# Patient Record
Sex: FEMALE | Race: WHITE | URBAN - METROPOLITAN AREA
[De-identification: names, ages, dates, MRNs, and addresses within clinical notes are randomized per-mention and may not be internally consistent; named-entity substitution may affect disease eponyms.]

---

## 2017-01-16 ENCOUNTER — OFFICE VISIT (OUTPATIENT)
Dept: FAMILY MEDICINE CLINIC | Age: 52
End: 2017-01-16

## 2017-01-16 VITALS
WEIGHT: 171.25 LBS | SYSTOLIC BLOOD PRESSURE: 142 MMHG | DIASTOLIC BLOOD PRESSURE: 84 MMHG | RESPIRATION RATE: 16 BRPM | HEIGHT: 69 IN | OXYGEN SATURATION: 98 % | BODY MASS INDEX: 25.36 KG/M2 | HEART RATE: 76 BPM | TEMPERATURE: 97.7 F

## 2017-01-16 DIAGNOSIS — I10 HTN, GOAL BELOW 140/90: Primary | ICD-10-CM

## 2017-01-16 DIAGNOSIS — J01.10 ACUTE NON-RECURRENT FRONTAL SINUSITIS: ICD-10-CM

## 2017-01-16 RX ORDER — AMOXICILLIN AND CLAVULANATE POTASSIUM 875; 125 MG/1; MG/1
1 TABLET, FILM COATED ORAL EVERY 12 HOURS
Qty: 10 TAB | Refills: 0 | Status: SHIPPED | OUTPATIENT
Start: 2017-01-16 | End: 2017-01-21

## 2017-01-16 RX ORDER — LISINOPRIL 5 MG/1
5 TABLET ORAL DAILY
Qty: 90 TAB | Refills: 0 | Status: SHIPPED | OUTPATIENT
Start: 2017-01-16 | End: 2020-10-13

## 2017-01-16 NOTE — PROGRESS NOTES
Patient Name: Maggi Gudino   MRN: [id number]    Leni Atkins is a 45 y.o. female who presents with the following:  Transferring care from prior PCP Dr. Harinder Damon. HTN  Was recommended to start on lisinopril 20 mg daily per last OV with prior PCP Dr. Harinder Damon but pt has not started it yet because she is weary of medications and would to ask clarifying questions. Has been taking her BP values at home which range b/t 140-150/70s. Denies CP, SOB, or leg edema. BP Readings from Last 3 Encounters:   01/16/17 142/84   12/30/16 140/81   08/12/16 161/90     Sinus pressure  Reports 3 weeks hx of frontal facial pressure, nasal congestion with green/brown mucus (worse in the mornings), and cough. Denies fever, nausea, vomiting. Has been using Nasocort and saline sprays. Noticed an irritated sore sport along the inner L nostril. Has been using Neosporin and Vaseline. Review of Systems   Constitutional: Negative. Negative for fever, malaise/fatigue and weight loss. HENT: Positive for congestion. Negative for ear discharge, ear pain, hearing loss, sore throat and tinnitus. Respiratory: Negative for cough, hemoptysis, shortness of breath and wheezing. Cardiovascular: Negative for chest pain, palpitations, leg swelling and PND. Gastrointestinal: Negative for abdominal pain, blood in stool, constipation, diarrhea, nausea and vomiting. Neurological: Positive for headaches. The patient's medications, allergies, past medical history, surgical history, family history and social history were reviewed and updated where appropriate. Current Outpatient Prescriptions   Medication Sig    lisinopril (PRINIVIL, ZESTRIL) 5 mg tablet Take 1 Tab by mouth daily.  amoxicillin-clavulanate (AUGMENTIN) 875-125 mg per tablet Take 1 Tab by mouth every twelve (12) hours for 5 days.     albuterol (PROAIR HFA) 90 mcg/actuation inhaler INHALE 2 PUFFS BY MOUTH EVERY 6 HOURS AS NEEDED FOR ASTHMA SYMPTOMS    ALPRAZolam (XANAX) 1 mg tablet Take 1 Tab by mouth once as needed (severe anxiety) for up to 1 dose. Max Daily Amount: 1 mg.  clobetasol (TEMOVATE) 0.05 % external solution     calcipotriene-betamethasone (TACLONEX) 0.005-0.064 % topical ointment     ketotifen (ZADITOR) 0.025 % (0.035 %) ophthalmic solution Administer 1 Drop to both eyes two (2) times a day.  triamcinolone (NASACORT) 55 mcg nasal inhaler 2 Sprays by Both Nostrils route daily.  ibuprofen (ADVIL) 100 mg tablet Take 100 mg by mouth every six (6) hours as needed for Pain.  cholecalciferol, vitamin D3, (VITAMIN D3) 2,000 unit Tab Take  by mouth. No current facility-administered medications for this visit.         Allergies   Allergen Reactions    Codeine Other (comments)     GI upset    Sudafed [Pseudoephedrine Hcl] Other (comments)     Intolerant, crawling sensation, jitteriness       Past Medical History   Diagnosis Date    AR (allergic rhinitis) 8/30/2010    Asthma 8/30/2010    HTN, goal below 140/90 1/16/2017    Psoriasis 8/30/2010       Past Surgical History   Procedure Laterality Date    Hx wisdom teeth extraction      Hx tonsillectomy      Hx adenoidectomy      Hx gyn       D&C       Family History   Problem Relation Age of Onset    Cancer Mother      breast    Heart Disease Mother     Hypertension Mother     Cancer Father      bladder    Heart Disease Father     Hypertension Father        Social History     Social History    Marital status:      Spouse name: N/A    Number of children: Y    Years of education: N/A     Occupational History    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    Smoking status: Former Smoker     Types: Cigarettes    Smokeless tobacco: Never Used      Comment: occasionally    Alcohol use 1.5 oz/week     3 Glasses of wine per week    Drug use: No    Sexual activity: Yes     Partners: Male     Birth control/ protection: None     Other Topics Concern  Not on file     Social History Narrative       OBJECTIVE      Visit Vitals    /84 (BP 1 Location: Right arm, BP Patient Position: Sitting)    Pulse 76    Temp 97.7 °F (36.5 °C) (Oral)    Resp 16    Ht 5' 9\" (1.753 m)    Wt 171 lb 4 oz (77.7 kg)    LMP 07/08/2016 (Approximate)    SpO2 98%    BMI 25.29 kg/m2       Physical Exam   Constitutional: She is well-developed, well-nourished, and in no distress. No distress. HENT:   Head: Normocephalic and atraumatic. Right Ear: Hearing, tympanic membrane, external ear and ear canal normal.   Left Ear: Hearing, tympanic membrane, external ear and ear canal normal.   Nose: Mucosal edema present. Nose lacerations: 0.5 x 0.5 cm patch that is erythematous with surrounding white mucus; no drainage or bleeding. Right sinus exhibits frontal sinus tenderness. Right sinus exhibits no maxillary sinus tenderness. Left sinus exhibits frontal sinus tenderness. Left sinus exhibits no maxillary sinus tenderness. Mouth/Throat: Uvula is midline and mucous membranes are normal. Posterior oropharyngeal erythema: posterior cobblestoning. Eyes: Conjunctivae and EOM are normal. Pupils are equal, round, and reactive to light. Neck: Normal range of motion. Neck supple. Cardiovascular: Normal rate, regular rhythm and normal heart sounds. Exam reveals no gallop and no friction rub. No murmur heard. Pulmonary/Chest: Effort normal and breath sounds normal. No respiratory distress. She has no wheezes. Lymphadenopathy:     She has no cervical adenopathy. Skin: She is not diaphoretic. Psychiatric: Mood, memory, affect and judgment normal.   Nursing note and vitals reviewed. ASSESSMENT AND PLAN  Loraine De Oliveira is a 45 y.o. female who presents today for:     1. HTN, goal below 140/90  Discussed BP goals based on JNC 8 guidelines. Will start low dose lisinopril and titrate as needed.  Pt will provide BP log from home over the next month and will adjust doses as necessary.  - lisinopril (PRINIVIL, ZESTRIL) 5 mg tablet; Take 1 Tab by mouth daily. Dispense: 90 Tab; Refill: 0    2. Acute non-recurrent frontal sinusitis  Recommend nasal rinses for the next week but if symptoms do not improve, may initiate abx given duration of symptoms. Pt may need medication for yeast infection s/p abx therapy; she will call if symptomatic. Noted small sore along inner nasal mucosa, likely from mild trauma from wiping; recommend Bacitracin BID x 1 week then Vaseline.  - amoxicillin-clavulanate (AUGMENTIN) 875-125 mg per tablet; Take 1 Tab by mouth every twelve (12) hours for 5 days. Dispense: 10 Tab; Refill: 0      Medications Discontinued During This Encounter   Medication Reason    lisinopril (PRINIVIL, ZESTRIL) 20 mg tablet Reorder       Follow-up Disposition:  Return in about 3 months (around 4/16/2017) for BP f/u. I have discussed the diagnosis with the patient and the intended plan as seen in the above orders. The patient has received an after-visit summary and questions were answered concerning future plans. I have discussed treatment side effects and warnings with the patient as well. Reviewed signs/symptoms of worsening condition that would require urgent evaluation.     Jd Arita M.D.

## 2017-01-16 NOTE — MR AVS SNAPSHOT
Visit Information Date & Time Provider Department Dept. Phone Encounter #  
 1/16/2017  2:15 PM Dwayne Kapadia  [facility] [phone] [id number] Follow-up Instructions Return in about 3 months (around 4/16/2017) for BP f/u. Upcoming Health Maintenance Date Due Pneumococcal 19-64 Medium Risk (1 of 1 - PPSV23) 3/5/1983 DTaP/Tdap/Td series (1 - Tdap) 3/5/1985 FOBT Q 1 YEAR AGE 50-75 1/5/2016 PAP AKA CERVICAL CYTOLOGY 1/5/2018 BREAST CANCER SCRN MAMMOGRAM 3/20/2018 Allergies as of 1/16/2017  Review Complete On: 1/16/2017 By: Anastasia Hooker LPN Severity Noted Reaction Type Reactions Codeine  08/30/2010    Other (comments) GI upset Sudafed [Pseudoephedrine Hcl]  11/07/2014    Other (comments) Intolerant, crawling sensation, jitteriness Current Immunizations  Reviewed on 11/7/2014 No immunizations on file. Not reviewed this visit You Were Diagnosed With   
  
 Codes Comments HTN, goal below 140/90    -  Primary ICD-10-CM: I10 
ICD-9-CM: 401.9 Essential hypertension     ICD-10-CM: I10 
ICD-9-CM: 401.9 Vitals BP Pulse Temp Resp Height(growth percentile) Weight(growth percentile) 142/84 (BP 1 Location: Right arm, BP Patient Position: Sitting) 76 97.7 °F (36.5 °C) (Oral) 16 5' 9\" (1.753 m) 171 lb 4 oz (77.7 kg) LMP SpO2 BMI OB Status Smoking Status 07/08/2016 (Approximate) 98% 25.29 kg/m2 Unknown Former Smoker Vitals History BMI and BSA Data Body Mass Index Body Surface Area  
 25.29 kg/m 2 1.94 m 2 [facility]erred [facility] [address] P[address] [phone] Your Updated Medication List  
  
   
This list is accurate as of: 1/16/17  3:06 PM.  Always use your most recent med list. ADVIL 100 mg tablet Generic drug:  ibuprofen Take 100 mg by mouth every six (6) hours as needed for Pain. albuterol 90 mcg/actuation inhaler Commonly known as:  PROAIR HFA INHALE 2 PUFFS BY MOUTH EVERY 6 HOURS AS NEEDED FOR ASTHMA SYMPTOMS ALPRAZolam 1 mg tablet Commonly known as:  Stryker Curio Take 1 Tab by mouth once as needed (severe anxiety) for up to 1 dose. Max Daily Amount: 1 mg.  
  
 calcipotriene-betamethasone 0.005-0.064 % topical ointment Commonly known as:  TACLONEX  
  
 clobetasol 0.05 % external solution Commonly known as:  TEMOVATE  
  
 lisinopril 5 mg tablet Commonly known as:  Marilynne Shows Take 1 Tab by mouth daily. triamcinolone 55 mcg nasal inhaler Commonly known as:  NASACORT  
2 Sprays by Both Nostrils route daily. VITAMIN D3 2,000 unit Tab Generic drug:  cholecalciferol (vitamin D3) Take  by mouth. ZADITOR 0.025 % (0.035 %) ophthalmic solution Generic drug:  ketotifen Administer 1 Drop to both eyes two (2) times a day. Prescriptions Sent to Pharmacy Refills  
 lisinopril (PRINIVIL, ZESTRIL) 5 mg tablet 0 Sig: Take 1 Tab by mouth daily. Class: Normal  
 Pharmacy: [address] M[address]ail Panda [facility] #: [phone] Route: Oral  
  
Follow-up Instructions Return in about 3 months (around 4/16/2017) for BP f/u. Patient Instructions DASH Diet: Care Instructions Your Care Instructions The DASH diet is an eating plan that can help lower your blood pressure. DASH stands for Dietary Approaches to Stop Hypertension. Hypertension is high blood pressure. The DASH diet focuses on eating foods that are high in calcium, potassium, and magnesium. These nutrients can lower blood pressure. The foods that are highest in these nutrients are fruits, vegetables, low-fat dairy products, nuts, seeds, and legumes.  But taking calcium, potassium, and magnesium supplements instead of eating foods that are high in those nutrients does not have the same effect. The DASH diet also includes whole grains, fish, and poultry. The DASH diet is one of several lifestyle changes your doctor may recommend to lower your high blood pressure. Your doctor may also want you to decrease the amount of sodium in your diet. Lowering sodium while following the DASH diet can lower blood pressure even further than just the DASH diet alone. Follow-up care is a key part of your treatment and safety. Be sure to make and go to all appointments, and call your doctor if you are having problems. It's also a good idea to know your test results and keep a list of the medicines you take. How can you care for yourself at home? Following the DASH diet · Eat 4 to 5 servings of fruit each day. A serving is 1 medium-sized piece of fruit, ½ cup chopped or canned fruit, 1/4 cup dried fruit, or 4 ounces (½ cup) of fruit juice. Choose fruit more often than fruit juice. · Eat 4 to 5 servings of vegetables each day. A serving is 1 cup of lettuce or raw leafy vegetables, ½ cup of chopped or cooked vegetables, or 4 ounces (½ cup) of vegetable juice. Choose vegetables more often than vegetable juice. · Get 2 to 3 servings of low-fat and fat-free dairy each day. A serving is 8 ounces of milk, 1 cup of yogurt, or 1 ½ ounces of cheese. · Eat 6 to 8 servings of grains each day. A serving is 1 slice of bread, 1 ounce of dry cereal, or ½ cup of cooked rice, pasta, or cooked cereal. Try to choose whole-grain products as much as possible. · Limit lean meat, poultry, and fish to 2 servings each day. A serving is 3 ounces, about the size of a deck of cards. · Eat 4 to 5 servings of nuts, seeds, and legumes (cooked dried beans, lentils, and split peas) each week. A serving is 1/3 cup of nuts, 2 tablespoons of seeds, or ½ cup of cooked beans or peas. · Limit fats and oils to 2 to 3 servings each day. A serving is 1 teaspoon of vegetable oil or 2 tablespoons of salad dressing. · Limit sweets and added sugars to 5 servings or less a week. A serving is 1 tablespoon jelly or jam, ½ cup sorbet, or 1 cup of lemonade. · Eat less than 2,300 milligrams (mg) of sodium a day. If you limit your sodium to 1,500 mg a day, you can lower your blood pressure even more. Tips for success · Start small. Do not try to make dramatic changes to your diet all at once. You might feel that you are missing out on your favorite foods and then be more likely to not follow the plan. Make small changes, and stick with them. Once those changes become habit, add a few more changes. · Try some of the following: ¨ Make it a goal to eat a fruit or vegetable at every meal and at snacks. This will make it easy to get the recommended amount of fruits and vegetables each day. ¨ Try yogurt topped with fruit and nuts for a snack or healthy dessert. ¨ Add lettuce, tomato, cucumber, and onion to sandwiches. ¨ Combine a ready-made pizza crust with low-fat mozzarella cheese and lots of vegetable toppings. Try using tomatoes, squash, spinach, broccoli, carrots, cauliflower, and onions. ¨ Have a variety of cut-up vegetables with a low-fat dip as an appetizer instead of chips and dip. ¨ Sprinkle sunflower seeds or chopped almonds over salads. Or try adding chopped walnuts or almonds to cooked vegetables. ¨ Try some vegetarian meals using beans and peas. Add garbanzo or kidney beans to salads. Make burritos and tacos with mashed ramirez beans or black beans. Where can you learn more? Go to http://zac-ирина.info/. Enter Q703 in the search box to learn more about \"DASH Diet: Care Instructions. \" Current as of: March 23, 2016 Content Version: 11.1 © 0256-1054 [employer].  Care instructions adapted under license by [facility] [address] (which disclaims liability or warranty for this information). If you have questions about a medical condition or this instruction, always ask your healthcare professional. [address] any warranty or liability for your use of this information. Bacitracin (On the skin) Bacitracin (bas-i-TRAY-sin) Prevents infection of minor cuts, burns, or scrapes. Brand Name(s): Antibiotic Ointment, Baciguent, Bacitraycin Plus, GRx Bacitracin Zinc, Quality Choice Bacitracin, [facility] [facility] Bacitracin Zinc  
There may be other brand names for this medicine. When This Medicine Should Not Be Used: This medicine is not right for everyone. Do not use it if you had an allergic reaction to bacitracin. How to Use This Medicine:  
Ointment · Your doctor will tell you how much medicine to use. Do not use more than directed. · Follow the instructions on the medicine label if you are using this medicine without a prescription. · Clean your wound before you apply this medicine. Apply a small amount to the wound 1 to 3 times each day. You may cover the wound with a clean bandage after you apply the medicine. · Do not use this medicine in your eyes or over large areas of your body. Do not use it for longer than 7 days in a row. · Missed dose: Take a dose as soon as you remember. If it is almost time for your next dose, wait until then and take a regular dose. Do not take extra medicine to make up for a missed dose. · Store the medicine in a closed container at room temperature, away from heat, moisture, and direct light. Drugs and Foods to Avoid: Ask your doctor or pharmacist before using any other medicine, including over-the-counter medicines, vitamins, and herbal products. Warnings While Using This Medicine: · Tell your doctor if you are pregnant or breastfeeding. · This medicine is for use only on minor cuts or burns.  Do not use it on an animal bite or other puncture wound, a serious burn (with blistering), or a deep cut or skin injury unless directed by your doctor. · Call your doctor if you see signs of infection near your wound, such as swelling, warmth, redness, or pus. · Keep all medicine out of the reach of children. Never share your medicine with anyone. Possible Side Effects While Using This Medicine:  
Call your doctor right away if you notice any of these side effects: · Allergic reaction: Itching or hives, swelling in your face or hands, swelling or tingling in your mouth or throat, chest tightness, trouble breathing If you notice these less serious side effects, talk with your doctor: · Mild itching or skin rash If you notice other side effects that you think are caused by this medicine, tell your doctor. Call your doctor for medical advice about side effects. You may report side effects to FDA at 5-193-FDA-5266 © 2016 [address] is for End User's use only and may not be sold, redistributed or otherwise used for commercial purposes. The above information is an  only. It is not intended as medical advice for individual conditions or treatments. Talk to your doctor, nurse or pharmacist before following any medical regimen to see if it is safe and effective for you. Introducing [facility]! Dear Haja Corea: 
Thank you for requesting a "[employer] account. Our records indicate that you already have an active "[employer] account. You can access your account anytime at [email or web address] Did you know that you can access your hospital and ER discharge instructions at any time in "[employer]? You can also review all of your test results from your hospital stay or ER visit. Additional Information If you have questions, please visit the Frequently Asked Questions section of the "[employer] website at [email or web address] Remember, MyChart is NOT to be used for urgent needs. For medical emergencies, dial 911. Now available from your iPhone and Android! Please provide this summary of care documentation to your next provider. Your primary care clinician is listed as Jarocho Layne. If you have any questions after today's visit, please call [phone].

## 2017-01-16 NOTE — PROGRESS NOTES
Patient presents in office today to transfer pcp from [address] [place] [address] and HTN check  Patient has not started lisinopril 20mg as she is leery of starting the medication has few concerns (fam hx htn)  Patient c/o  post nasal drip, cough-green/brownish in am, sore inside nose, subtle sore throat, sinus pressure x Dec 20, 2016    Reviewed record in preparation for visit and have obtained necessary documentation. Identified pt with two pt identifiers(name and ).       Health Maintenance Due   Topic    Pneumococcal 19-64 Medium Risk (1 of 1 - PPSV23)    DTaP/Tdap/Td series (1 - Tdap)    FOBT Q 1 YEAR AGE 50-75          Chief Complaint   Patient presents with   [facility] transfer    Blood Pressure Check     2 week follow up         Wt Readings from Last 3 Encounters:   17 171 lb 4 oz (77.7 kg)   16 170 lb (77.1 kg)   16 165 lb (74.8 kg)     Temp Readings from Last 3 Encounters:   17 97.7 °F (36.5 °C) (Oral)   16 98.6 °F (37 °C) (Oral)   16 97.6 °F (36.4 °C) (Oral)     BP Readings from Last 3 Encounters:   17 142/84   16 140/81   16 161/90     Pulse Readings from Last 3 Encounters:   17 76   16 93   16 94           Learning Assessment:  :     Learning Assessment 2015   PRIMARY LEARNER Patient   HIGHEST LEVEL OF EDUCATION - PRIMARY LEARNER  4 YEARS OF COLLEGE   PRIMARY LANGUAGE ENGLISH   LEARNER PREFERENCE PRIMARY LISTENING   ANSWERED BY patient   RELATIONSHIP SELF       Depression Screening:  :     PHQ 2 / 9, over the last two weeks 2017   Little interest or pleasure in doing things Not at all   Feeling down, depressed or hopeless Not at all   Total Score PHQ 2 0       Coordination of Care Questionnaire:  :     1) Have you been to an emergency room, urgent care clinic since your last visit? no   Hospitalized since your last visit? no             2) Have you seen or consulted any other health care providers outside of [facility] [address] since your last visit? no  (Include any pap smears or colon screenings in this section.)    Patient is accompanied by self I have received verbal consent from Fifi Nolan to discuss any/all medical information while they are present in the room.

## 2017-01-16 NOTE — PATIENT INSTRUCTIONS
DASH Diet: Care Instructions  Your Care Instructions  The DASH diet is an eating plan that can help lower your blood pressure. DASH stands for Dietary Approaches to Stop Hypertension. Hypertension is high blood pressure. The DASH diet focuses on eating foods that are high in calcium, potassium, and magnesium. These nutrients can lower blood pressure. The foods that are highest in these nutrients are fruits, vegetables, low-fat dairy products, nuts, seeds, and legumes. But taking calcium, potassium, and magnesium supplements instead of eating foods that are high in those nutrients does not have the same effect. The DASH diet also includes whole grains, fish, and poultry. The DASH diet is one of several lifestyle changes your doctor may recommend to lower your high blood pressure. Your doctor may also want you to decrease the amount of sodium in your diet. Lowering sodium while following the DASH diet can lower blood pressure even further than just the DASH diet alone. Follow-up care is a key part of your treatment and safety. Be sure to make and go to all appointments, and call your doctor if you are having problems. It's also a good idea to know your test results and keep a list of the medicines you take. How can you care for yourself at home? Following the DASH diet  · Eat 4 to 5 servings of fruit each day. A serving is 1 medium-sized piece of fruit, ½ cup chopped or canned fruit, 1/4 cup dried fruit, or 4 ounces (½ cup) of fruit juice. Choose fruit more often than fruit juice. · Eat 4 to 5 servings of vegetables each day. A serving is 1 cup of lettuce or raw leafy vegetables, ½ cup of chopped or cooked vegetables, or 4 ounces (½ cup) of vegetable juice. Choose vegetables more often than vegetable juice. · Get 2 to 3 servings of low-fat and fat-free dairy each day. A serving is 8 ounces of milk, 1 cup of yogurt, or 1 ½ ounces of cheese. · Eat 6 to 8 servings of grains each day.  A serving is 1 slice of bread, 1 ounce of dry cereal, or ½ cup of cooked rice, pasta, or cooked cereal. Try to choose whole-grain products as much as possible. · Limit lean meat, poultry, and fish to 2 servings each day. A serving is 3 ounces, about the size of a deck of cards. · Eat 4 to 5 servings of nuts, seeds, and legumes (cooked dried beans, lentils, and split peas) each week. A serving is 1/3 cup of nuts, 2 tablespoons of seeds, or ½ cup of cooked beans or peas. · Limit fats and oils to 2 to 3 servings each day. A serving is 1 teaspoon of vegetable oil or 2 tablespoons of salad dressing. · Limit sweets and added sugars to 5 servings or less a week. A serving is 1 tablespoon jelly or jam, ½ cup sorbet, or 1 cup of lemonade. · Eat less than 2,300 milligrams (mg) of sodium a day. If you limit your sodium to 1,500 mg a day, you can lower your blood pressure even more. Tips for success  · Start small. Do not try to make dramatic changes to your diet all at once. You might feel that you are missing out on your favorite foods and then be more likely to not follow the plan. Make small changes, and stick with them. Once those changes become habit, add a few more changes. · Try some of the following:  ¨ Make it a goal to eat a fruit or vegetable at every meal and at snacks. This will make it easy to get the recommended amount of fruits and vegetables each day. ¨ Try yogurt topped with fruit and nuts for a snack or healthy dessert. ¨ Add lettuce, tomato, cucumber, and onion to sandwiches. ¨ Combine a ready-made pizza crust with low-fat mozzarella cheese and lots of vegetable toppings. Try using tomatoes, squash, spinach, broccoli, carrots, cauliflower, and onions. ¨ Have a variety of cut-up vegetables with a low-fat dip as an appetizer instead of chips and dip. ¨ Sprinkle sunflower seeds or chopped almonds over salads. Or try adding chopped walnuts or almonds to cooked vegetables. ¨ Try some vegetarian meals using beans and peas. Add garbanzo or kidney beans to salads. Make burritos and tacos with mashed ramirez beans or black beans. Where can you learn more? Go to http://zac-ирина.info/. Enter N469 in the search box to learn more about \"DASH Diet: Care Instructions. \"  Current as of: March 23, 2016  Content Version: 11.1  © 2006-2016 [employer]. Care instructions adapted under license by [employer] (which disclaims liability or warranty for this information). If you have questions about a medical condition or this instruction, always ask your healthcare professional. [place] [address] any warranty or liability for your use of this information. Bacitracin (On the skin)   Bacitracin (bas-i-TRAY-sin)  Prevents infection of minor cuts, burns, or scrapes. Brand Name(s): Antibiotic Ointment, Baciguent, Bacitraycin Plus, GRx Bacitracin Zinc, Quality Choice Bacitracin, [employer] Bacitracin Zinc   There may be other brand names for this medicine. When This Medicine Should Not Be Used: This medicine is not right for everyone. Do not use it if you had an allergic reaction to bacitracin. How to Use This Medicine:   Ointment  · Your doctor will tell you how much medicine to use. Do not use more than directed. · Follow the instructions on the medicine label if you are using this medicine without a prescription. · Clean your wound before you apply this medicine. Apply a small amount to the wound 1 to 3 times each day. You may cover the wound with a clean bandage after you apply the medicine. · Do not use this medicine in your eyes or over large areas of your body. Do not use it for longer than 7 days in a row. · Missed dose: Take a dose as soon as you remember. If it is almost time for your next dose, wait until then and take a regular dose. Do not take extra medicine to make up for a missed dose.   · Store the medicine in a closed container at room temperature, away from heat, moisture, and direct light. Drugs and Foods to Avoid:      Ask your doctor or pharmacist before using any other medicine, including over-the-counter medicines, vitamins, and herbal products. Warnings While Using This Medicine:   · Tell your doctor if you are pregnant or breastfeeding. · This medicine is for use only on minor cuts or burns. Do not use it on an animal bite or other puncture wound, a serious burn (with blistering), or a deep cut or skin injury unless directed by your doctor. · Call your doctor if you see signs of infection near your wound, such as swelling, warmth, redness, or pus. · Keep all medicine out of the reach of children. Never share your medicine with anyone. Possible Side Effects While Using This Medicine:   Call your doctor right away if you notice any of these side effects:  · Allergic reaction: Itching or hives, swelling in your face or hands, swelling or tingling in your mouth or throat, chest tightness, trouble breathing  If you notice these less serious side effects, talk with your doctor:   · Mild itching or skin rash  If you notice other side effects that you think are caused by this medicine, tell your doctor. Call your doctor for medical advice about side effects. You may report side effects to FDA at 9-586-FDA-9126  © 2016 [address] is for End User's use only and may not be sold, redistributed or otherwise used for commercial purposes. The above information is an  only. It is not intended as medical advice for individual conditions or treatments. Talk to your doctor, nurse or pharmacist before following any medical regimen to see if it is safe and effective for you.

## 2018-02-01 DIAGNOSIS — J45.20 MILD INTERMITTENT ASTHMA WITHOUT COMPLICATION: ICD-10-CM

## 2018-02-01 RX ORDER — ALBUTEROL SULFATE 90 UG/1
AEROSOL, METERED RESPIRATORY (INHALATION)
Qty: 1 INHALER | Refills: 2 | Status: SHIPPED | OUTPATIENT
Start: 2018-02-01 | End: 2018-10-12 | Stop reason: SDUPTHER

## 2018-09-20 ENCOUNTER — OFFICE VISIT (OUTPATIENT)
Dept: FAMILY MEDICINE CLINIC | Age: 53
End: 2018-09-20

## 2018-09-20 VITALS
SYSTOLIC BLOOD PRESSURE: 171 MMHG | WEIGHT: 175 LBS | HEART RATE: 95 BPM | DIASTOLIC BLOOD PRESSURE: 84 MMHG | HEIGHT: 69 IN | TEMPERATURE: 98.2 F | OXYGEN SATURATION: 99 % | BODY MASS INDEX: 25.92 KG/M2 | RESPIRATION RATE: 16 BRPM

## 2018-09-20 DIAGNOSIS — J06.9 ACUTE URI: Primary | ICD-10-CM

## 2018-09-20 DIAGNOSIS — I10 ESSENTIAL HYPERTENSION: ICD-10-CM

## 2018-09-20 DIAGNOSIS — F41.1 GAD (GENERALIZED ANXIETY DISORDER): ICD-10-CM

## 2018-09-20 RX ORDER — AZITHROMYCIN 250 MG/1
TABLET, FILM COATED ORAL
Qty: 6 TAB | Refills: 0 | Status: SHIPPED | OUTPATIENT
Start: 2018-09-20 | End: 2018-09-25

## 2018-09-20 NOTE — MR AVS SNAPSHOT
Darling Mendieta 
 
 
 [address] 
[phone] Patient: Abimael Valles MRN: [id number] NHN:[id number] Visit Information Date & Time Provider Department Dept. Phone Encounter #  
 9/20/2018  3:30 PM Jerald Center, NP [facility] [phone] [id number] Follow-up Instructions Return in about 4 weeks (around 10/18/2018) for Complete Physical.  
  
Upcoming Health Maintenance Date Due Pneumococcal 19-64 Medium Risk (1 of 1 - PPSV23) 3/5/1983 DTaP/Tdap/Td series (1 - Tdap) 3/5/1985 FOBT Q 1 YEAR AGE 50-75 1/5/2016 PAP AKA CERVICAL CYTOLOGY 1/5/2018 BREAST CANCER SCRN MAMMOGRAM 3/20/2018 Influenza Age 5 to Adult 8/1/2018 Allergies as of 9/20/2018  Review Complete On: 9/20/2018 By: Esperanza Edwards LPN Severity Noted Reaction Type Reactions Codeine  08/30/2010    Other (comments) GI upset Sudafed [Pseudoephedrine Hcl]  11/07/2014    Other (comments) Intolerant, crawling sensation, jitteriness Current Immunizations  Reviewed on 11/7/2014 No immunizations on file. Not reviewed this visit You Were Diagnosed With   
  
 Codes Comments Acute URI    -  Primary ICD-10-CM: J06.9 ICD-9-CM: 465.9 Essential hypertension     ICD-10-CM: I10 
ICD-9-CM: 401.9 ANTONIO (generalized anxiety disorder)     ICD-10-CM: F41.1 ICD-9-CM: 300.02 Vitals BP Pulse Temp Resp Height(growth percentile) Weight(growth percentile) 171/84 (BP 1 Location: Left arm, BP Patient Position: Sitting) 95 98.2 °F (36.8 °C) (Oral) 16 5' 9\" (1.753 m) 175 lb (79.4 kg) SpO2 BMI OB Status Smoking Status 99% 25.84 kg/m2 Unknown Former Smoker Vitals History BMI and BSA Data Body Mass Index Body Surface Area  
 25.84 kg/m 2 1.97 m 2 Preferred Pharmacy Pharmacy Name Phone  [id number] [facility] [address] [facility] [phone] Your Updated Medication List  
  
   
This list is accurate as of 9/20/18  4:28 PM.  Always use your most recent med list. ADVIL 100 mg tablet Generic drug:  ibuprofen Take 100 mg by mouth every six (6) hours as needed for Pain. ALPRAZolam 1 mg tablet Commonly known as:  [place] Curio Take 1 Tab by mouth once as needed (severe anxiety) for up to 1 dose. Max Daily Amount: 1 mg.  
  
 azithromycin 250 mg tablet Commonly known as:  [place] Sleight Take 2 tablets today, then take 1 tablet daily  
  
 calcipotriene-betamethasone 0.005-0.064 % topical ointment Commonly known as:  TACLONEX  
  
 clobetasol 0.05 % external solution Commonly known as:  TEMOVATE  
  
 lisinopril 5 mg tablet Commonly known as:  Shaynalynne Shows Take 1 Tab by mouth daily. PROAIR HFA 90 mcg/actuation inhaler Generic drug:  albuterol INHALE 2 INHALATIONS BY MOUTH EVERY 6 HOURS AS NEEDED FOR WHEEZING OR SHORTNESS OF BREATH  
  
 triamcinolone 55 mcg nasal inhaler Commonly known as:  NASACORT  
2 Sprays by Both Nostrils route daily. VITAMIN D3 2,000 unit Tab Generic drug:  cholecalciferol (vitamin D3) Take  by mouth. ZADITOR 0.025 % (0.035 %) ophthalmic solution Generic drug:  ketotifen Administer 1 Drop to both eyes two (2) times a day. Prescriptions Sent to Pharmacy Refills  
 azithromycin (ZITHROMAX) 250 mg tablet 0 Sig: Take 2 tablets today, then take 1 tablet daily Class: Normal  
 Pharmacy: [facility] Mikhail Umair Funes [facility] #: [phone] Follow-up Instructions Return in about 4 weeks (around 10/18/2018) for Complete Physical.  
  
  
Patient Instructions High Blood Pressure: Care Instructions Your Care Instructions If your blood pressure is usually above 130/80, you have high blood pressure, or hypertension. That means the top number is 130 or higher or the bottom number is 80 or higher, or both. Despite what a lot of people think, high blood pressure usually doesn't cause headaches or make you feel dizzy or lightheaded. It usually has no symptoms. But it does increase your risk for heart attack, stroke, and kidney or eye damage. The higher your blood pressure, the more your risk increases. Your doctor will give you a goal for your blood pressure. Your goal will be based on your health and your age. Lifestyle changes, such as eating healthy and being active, are always important to help lower blood pressure. You might also take medicine to reach your blood pressure goal. 
Follow-up care is a key part of your treatment and safety. Be sure to make and go to all appointments, and call your doctor if you are having problems. It's also a good idea to know your test results and keep a list of the medicines you take. How can you care for yourself at home? Medical treatment · If you stop taking your medicine, your blood pressure will go back up. You may take one or more types of medicine to lower your blood pressure. Be safe with medicines. Take your medicine exactly as prescribed. Call your doctor if you think you are having a problem with your medicine. · Talk to your doctor before you start taking aspirin every day. Aspirin can help certain people lower their risk of a heart attack or stroke. But taking aspirin isn't right for everyone, because it can cause serious bleeding. · See your doctor regularly. You may need to see the doctor more often at first or until your blood pressure comes down. · If you are taking blood pressure medicine, talk to your doctor before you take decongestants or anti-inflammatory medicine, such as ibuprofen. Some of these medicines can raise blood pressure. · Learn how to check your blood pressure at home. Lifestyle changes · Stay at a healthy weight. This is especially important if you put on weight around the waist. Losing even 10 pounds can help you lower your blood pressure. · If your doctor recommends it, get more exercise. Walking is a good choice. Bit by bit, increase the amount you walk every day. Try for at least 30 minutes on most days of the week. You also may want to swim, bike, or do other activities. · Avoid or limit alcohol. Talk to your doctor about whether you can drink any alcohol. · Try to limit how much sodium you eat to less than 2,300 milligrams (mg) a day. Your doctor may ask you to try to eat less than 1,500 mg a day. · Eat plenty of fruits (such as bananas and oranges), vegetables, legumes, whole grains, and low-fat dairy products. · Lower the amount of saturated fat in your diet. Saturated fat is found in animal products such as milk, cheese, and meat. Limiting these foods may help you lose weight and also lower your risk for heart disease. · Do not smoke. Smoking increases your risk for heart attack and stroke. If you need help quitting, talk to your doctor about stop-smoking programs and medicines. These can increase your chances of quitting for good. When should you call for help? Call 911 anytime you think you may need emergency care. This may mean having symptoms that suggest that your blood pressure is causing a serious heart or blood vessel problem. Your blood pressure may be over 180/110. 
 For example, call 911 if: 
  · You have symptoms of a heart attack. These may include: ¨ Chest pain or pressure, or a strange feeling in the chest. 
¨ Sweating. ¨ Shortness of breath. ¨ Nausea or vomiting. ¨ Pain, pressure, or a strange feeling in the back, neck, jaw, or upper belly or in one or both shoulders or arms. ¨ Lightheadedness or sudden weakness. ¨ A fast or irregular heartbeat.  
  · You have symptoms of a stroke. These may include: ¨ Sudden numbness, tingling, weakness, or loss of movement in your face, arm, or leg, especially on only one side of your body. ¨ Sudden vision changes. ¨ Sudden trouble speaking. ¨ Sudden confusion or trouble understanding simple statements. ¨ Sudden problems with walking or balance. ¨ A sudden, severe headache that is different from past headaches.  
  · You have severe back or belly pain.  
 Do not wait until your blood pressure comes down on its own. Get help right away. 
 Call your doctor now or seek immediate care if: 
  · Your blood pressure is much higher than normal (such as 180/110 or higher), but you don't have symptoms.  
  · You think high blood pressure is causing symptoms, such as: ¨ Severe headache. ¨ Blurry vision.  
 Watch closely for changes in your health, and be sure to contact your doctor if: 
  · Your blood pressure measures 140/90 or higher at least 2 times. That means the top number is 140 or higher or the bottom number is 90 or higher, or both.  
  · You think you may be having side effects from your blood pressure medicine.  
  · Your blood pressure is usually normal, but it goes above normal at least 2 times. Where can you learn more? Go to http://zac-ирина.info/. Enter Q722 in the search box to learn more about \"High Blood Pressure: Care Instructions. \" Current as of: December 6, 2017 Content Version: 11.7 © 9641-8943 [employer]. Care instructions adapted under license by [employer] (which disclaims liability or warranty for this information). If you have questions about a medical condition or this instruction, always ask your healthcare professional. [place] [address] any warranty or liability for your use of this information. Introducing [facility]! Dear Lizzie Dobbs: 
Thank you for requesting a [employer] account.   Our records indicate that you already have an active GoGoVan account. You can access your account anytime at [email or web address]. [email or web address] Did you know that you can access your hospital and ER discharge instructions at any time in GoGoVan? You can also review all of your test results from your hospital stay or ER visit. Additional Information If you have questions, please visit the Frequently Asked Questions section of the GoGoVan website at [email or web address] Remember, GoGoVan is NOT to be used for urgent needs. For medical emergencies, dial 911. Now available from your iPhone and Android! Please provide this summary of care documentation to your next provider. Your primary care clinician is listed as Jarocho Layne. If you have any questions after today's visit, please call [phone].

## 2018-09-20 NOTE — PROGRESS NOTES
Chief Complaint Patient presents with  Sore Throat  
  x 1 1/2 week  Cough  
  productive  Headache 1. Have you been to the ER, urgent care clinic since your last visit? Hospitalized since your last visit? No 
 
2. Have you seen or consulted any other health care providers outside of the [address] [place] [address] since your last visit? Include any pap smears or colon screening.  No

## 2018-09-20 NOTE — PROGRESS NOTES
Assessment/Plan:  
 
Diagnoses and all orders for this visit: 
 
1. Acute URI 
-     azithromycin (ZITHROMAX) 250 mg tablet; Take 2 tablets today, then take 1 tablet daily Treatment as above. Discussed symptomatic care including rest and fluids. 2. Essential hypertension Continue to monitor blood pressures for a goal of 140/90 or less. 3. ANTONIO (generalized anxiety disorder) Discussed potential for Paxil. Follow-up Disposition: 
Return in about 4 weeks (around 10/18/2018) for Complete Physical. 
 
Discussed expected course/resolution/complications of diagnosis in detail with patient.   
Medication risks/benefits/costs/interactions/alternatives discussed with patient.   
Pt was given after visit summary which includes diagnoses, current medications & vitals. Pt expressed understanding with the diagnosis and plan Subjective:  
  
Farhat Gale is a 46 y.o. female who presents for had concerns including Sore Throat; Cough; and Headache. Has been off Lisinopril for several months. Upper Respiratory Infection Patient complains of symptoms of a URI. Symptoms include congestion, sore throat, swollen glands and cough. Onset of symptoms was 10 days ago, unchanged since that time. She also c/o achiness and congestion for the past 10 days . She is drinking plenty of fluids. . Evaluation to date: none. Treatment to date: OTC products, which have been ineffective. Current Outpatient Prescriptions Medication Sig Dispense Refill  azithromycin (ZITHROMAX) 250 mg tablet Take 2 tablets today, then take 1 tablet daily 6 Tab 0  
 PROAIR HFA 90 mcg/actuation inhaler INHALE 2 INHALATIONS BY MOUTH EVERY 6 HOURS AS NEEDED FOR WHEEZING OR SHORTNESS OF BREATH 1 Inhaler 2  
 lisinopril (PRINIVIL, ZESTRIL) 5 mg tablet Take 1 Tab by mouth daily. 90 Tab 0  ALPRAZolam (XANAX) 1 mg tablet Take 1 Tab by mouth once as needed (severe anxiety) for up to 1 dose.  Max Daily Amount: 1 mg. 15 Tab 0  
  clobetasol (TEMOVATE) 0.05 % external solution   2  
 calcipotriene-betamethasone (TACLONEX) 0.005-0.064 % topical ointment   3  
 ketotifen (ZADITOR) 0.025 % (0.035 %) ophthalmic solution Administer 1 Drop to both eyes two (2) times a day.  triamcinolone (NASACORT) 55 mcg nasal inhaler 2 Sprays by Both Nostrils route daily. 1 Bottle 5  ibuprofen (ADVIL) 100 mg tablet Take 100 mg by mouth every six (6) hours as needed for Pain.  cholecalciferol, vitamin D3, (VITAMIN D3) 2,000 unit Tab Take  by mouth. Allergies Allergen Reactions  Codeine Other (comments) GI upset  Sudafed [Pseudoephedrine Hcl] Other (comments) Intolerant, crawling sensation, jitteriness ROS:  
Review of Systems Constitutional: Negative for chills, fever and malaise/fatigue. HENT: Positive for congestion and sore throat. Negative for ear pain and sinus pain. Respiratory: Positive for cough. Negative for sputum production, shortness of breath and wheezing. Cardiovascular: Negative for chest pain. Neurological: Negative for seizures. Endo/Heme/Allergies: Negative for environmental allergies. Objective:  
 
Visit Vitals  /84 (BP 1 Location: Left arm, BP Patient Position: Sitting)  Pulse 95  Temp 98.2 °F (36.8 °C) (Oral)  Resp 16  
 Ht 5' 9\" (1.753 m)  Wt 175 lb (79.4 kg)  SpO2 99%  BMI 25.84 kg/m2 Vitals and Nurse Documentation reviewed. Physical Exam  
Constitutional: No distress. HENT:  
Right Ear: Tympanic membrane is not erythematous and not bulging. No middle ear effusion. Left Ear: Tympanic membrane is not erythematous and not bulging. No middle ear effusion. Nose: No rhinorrhea. Right sinus exhibits no maxillary sinus tenderness and no frontal sinus tenderness. Left sinus exhibits no maxillary sinus tenderness and no frontal sinus tenderness. Mouth/Throat: Posterior oropharyngeal erythema present. No oropharyngeal exudate. Eyes: EOM and lids are normal.  
Cardiovascular: S1 normal and S2 normal.  Exam reveals no gallop and no friction rub. No murmur heard. Pulmonary/Chest: Breath sounds normal. She has no wheezes. Lymphadenopathy:  
  She has no cervical adenopathy. Skin: Skin is warm and dry.   
Psychiatric: Mood and affect normal.

## 2018-09-20 NOTE — PATIENT INSTRUCTIONS

## 2018-11-04 DIAGNOSIS — J45.20 MILD INTERMITTENT ASTHMA WITHOUT COMPLICATION: ICD-10-CM

## 2018-11-05 RX ORDER — ALBUTEROL SULFATE 90 UG/1
AEROSOL, METERED RESPIRATORY (INHALATION)
Qty: 1 INHALER | Refills: 0 | Status: SHIPPED | OUTPATIENT
Start: 2018-11-05 | End: 2019-11-26 | Stop reason: SDUPTHER

## 2019-07-02 ENCOUNTER — OFFICE VISIT (OUTPATIENT)
Dept: FAMILY MEDICINE CLINIC | Age: 54
End: 2019-07-02

## 2019-07-02 VITALS
HEIGHT: 69 IN | SYSTOLIC BLOOD PRESSURE: 132 MMHG | TEMPERATURE: 98 F | DIASTOLIC BLOOD PRESSURE: 80 MMHG | OXYGEN SATURATION: 96 % | WEIGHT: 175 LBS | BODY MASS INDEX: 25.92 KG/M2 | HEART RATE: 81 BPM | RESPIRATION RATE: 18 BRPM

## 2019-07-02 DIAGNOSIS — Z00.00 ROUTINE GENERAL MEDICAL EXAMINATION AT A HEALTH CARE FACILITY: Primary | ICD-10-CM

## 2019-07-02 DIAGNOSIS — E78.2 MIXED HYPERLIPIDEMIA: ICD-10-CM

## 2019-07-02 DIAGNOSIS — R73.03 PREDIABETES: ICD-10-CM

## 2019-07-02 DIAGNOSIS — I10 ESSENTIAL HYPERTENSION: ICD-10-CM

## 2019-07-02 DIAGNOSIS — Z12.11 SCREENING FOR COLON CANCER: ICD-10-CM

## 2019-07-02 NOTE — PROGRESS NOTES
Assessment/Plan:     Diagnoses and all orders for this visit:    1. Routine general medical examination at a health care facility  -     CBC WITH AUTOMATED DIFF  -     METABOLIC PANEL, COMPREHENSIVE  She declines colonoscopy. Cologuard pending. She will follow up for overdue mammogram and pap smear with her Ob/gyn. She declines all vaccinations. 2. Screening for colon cancer  -     COLOGUARD TEST (FECAL DNA COLORECTAL CANCER SCREENING)    3. Essential hypertension  -     MICROALBUMIN, UR, RAND W/ MICROALB/CREAT RATIO  Stable off therapy at this time. Continue to monitor blood pressures for a goal of 140/90 or less. 4. Mixed hyperlipidemia  -     LIPID PANEL    5. Prediabetes  -     HEMOGLOBIN A1C WITH EAG  I have reviewed/discussed the above normal BMI with the patient. I have recommended the following interventions: dietary management education, guidance, and counseling, encourage exercise, monitor weight and prescribed dietary intake. Given written instructions as well. Follow-up and Dispositions    · Return in about 3 months (around 10/2/2019) for Follow Up. Discussed expected course/resolution/complications of diagnosis in detail with patient. Medication risks/benefits/costs/interactions/alternatives discussed with patient. Pt was given after visit summary which includes diagnoses, current medications & vitals. Pt expressed understanding with the diagnosis and plan          Subjective:      Baldo Ellison is a 53 y.o. female who presents for had concerns including Blood Pressure Check (blood pressure check. patient has readings with her today). Hypertension  Patient is here for follow-up of hypertension. She indicates that she is feeling well and denies any symptoms referable to her hypertension, including chest pain, palpitations, dyspnea, orthopnea, or peripheral edema. Patient has these side effects of medication: none.  She did not start Lisinopril as  She is exercising and is adherent to low salt diet. Blood pressure is not well controlled at home. Use of agents associated with hypertension: none. History of renal disease: no.  Cardiovascular risk factors: family history, dyslipidemia, hypertension, post-menopausal.      She is followed by Lori Siu for ob/gyn. She is due in September. She has never undergone colonoscopy. She declines all vaccinations. She reports her psoriasis as uncontrolled. She is not currently on treatment. She is followed by dermatology. Patient Active Problem List   Diagnosis Code    AR (allergic rhinitis) J30.9    Asthma J45.909    Psoriasis L40.9    HTN, goal below 140/90 I10       Current Outpatient Medications   Medication Sig Dispense Refill    PROAIR HFA 90 mcg/actuation inhaler INHALE 2 PUFFS BY MOUTH EVERY 6 HOURS AS NEEDED FOR WHEEZING OR SHORTNESS OF BREATH 1 Inhaler 0    ALPRAZolam (XANAX) 1 mg tablet Take 1 Tab by mouth once as needed (severe anxiety) for up to 1 dose. Max Daily Amount: 1 mg. 15 Tab 0    ketotifen (ZADITOR) 0.025 % (0.035 %) ophthalmic solution Administer 1 Drop to both eyes two (2) times a day.  triamcinolone (NASACORT) 55 mcg nasal inhaler 2 Sprays by Both Nostrils route daily. 1 Bottle 5    ibuprofen (ADVIL) 100 mg tablet Take 100 mg by mouth every six (6) hours as needed for Pain.  cholecalciferol, vitamin D3, (VITAMIN D3) 2,000 unit Tab Take  by mouth.  lisinopril (PRINIVIL, ZESTRIL) 5 mg tablet Take 1 Tab by mouth daily. 90 Tab 0       Allergies   Allergen Reactions    Codeine Other (comments)     GI upset    Sudafed [Pseudoephedrine Hcl] Other (comments)     Intolerant, crawling sensation, jitteriness       ROS:   Review of Systems   Constitutional: Negative for fever, malaise/fatigue and weight loss. HENT: Negative for hearing loss. Eyes: Negative for blurred vision and pain. Respiratory: Negative for cough and shortness of breath.     Cardiovascular: Negative for chest pain, palpitations and leg swelling. Gastrointestinal: Negative for abdominal pain, blood in stool, constipation, diarrhea and melena. Genitourinary: Negative for dysuria and hematuria. Musculoskeletal: Negative for joint pain. Skin: Positive for rash. Neurological: Negative for headaches. Psychiatric/Behavioral: Negative for depression. The patient is not nervous/anxious and does not have insomnia. Objective:     Visit Vitals  /80 (BP 1 Location: Left arm, BP Patient Position: Sitting)   Pulse 81   Temp 98 °F (36.7 °C) (Oral)   Resp 18   Ht 5' 9\" (1.753 m)   Wt 175 lb (79.4 kg)   LMP  (LMP Unknown)   SpO2 96%   BMI 25.84 kg/m²       Vitals and Nurse Documentation reviewed. Physical Exam   Constitutional: No distress. HENT:   Right Ear: No drainage. Tympanic membrane is not injected, not erythematous and not bulging. No middle ear effusion. Left Ear: No drainage. Tympanic membrane is not injected, not erythematous and not bulging. No middle ear effusion. Nose: No mucosal edema or rhinorrhea. Mouth/Throat: Normal dentition. No oropharyngeal exudate, posterior oropharyngeal erythema or tonsillar abscesses. Eyes: Pupils are equal, round, and reactive to light. Neck: No JVD present. Carotid bruit is not present. No tracheal deviation present. No thyroid mass and no thyromegaly present. Cardiovascular: S1 normal and S2 normal. Exam reveals no gallop and no friction rub. No murmur heard. Pulses:       Dorsalis pedis pulses are 2+ on the right side, and 2+ on the left side. Posterior tibial pulses are 2+ on the right side, and 2+ on the left side. Pulmonary/Chest: Breath sounds normal. She has no wheezes. Abdominal: Soft. Bowel sounds are normal. She exhibits no distension and no mass. There is no hepatosplenomegaly. There is no tenderness. Lymphadenopathy:     She has no cervical adenopathy. She has no axillary adenopathy.    Neurological: She has normal motor skills, normal sensation and normal strength. No cranial nerve deficit. Gait normal.   Skin: Skin is warm and dry.    Psychiatric: Mood, memory, affect and judgment normal.

## 2019-07-02 NOTE — PATIENT INSTRUCTIONS
DASH Diet: Care Instructions  Your Care Instructions    The DASH diet is an eating plan that can help lower your blood pressure. DASH stands for Dietary Approaches to Stop Hypertension. Hypertension is high blood pressure. The DASH diet focuses on eating foods that are high in calcium, potassium, and magnesium. These nutrients can lower blood pressure. The foods that are highest in these nutrients are fruits, vegetables, low-fat dairy products, nuts, seeds, and legumes. But taking calcium, potassium, and magnesium supplements instead of eating foods that are high in those nutrients does not have the same effect. The DASH diet also includes whole grains, fish, and poultry. The DASH diet is one of several lifestyle changes your doctor may recommend to lower your high blood pressure. Your doctor may also want you to decrease the amount of sodium in your diet. Lowering sodium while following the DASH diet can lower blood pressure even further than just the DASH diet alone. Follow-up care is a key part of your treatment and safety. Be sure to make and go to all appointments, and call your doctor if you are having problems. It's also a good idea to know your test results and keep a list of the medicines you take. How can you care for yourself at home? Following the DASH diet  · Eat 4 to 5 servings of fruit each day. A serving is 1 medium-sized piece of fruit, ½ cup chopped or canned fruit, 1/4 cup dried fruit, or 4 ounces (½ cup) of fruit juice. Choose fruit more often than fruit juice. · Eat 4 to 5 servings of vegetables each day. A serving is 1 cup of lettuce or raw leafy vegetables, ½ cup of chopped or cooked vegetables, or 4 ounces (½ cup) of vegetable juice. Choose vegetables more often than vegetable juice. · Get 2 to 3 servings of low-fat and fat-free dairy each day. A serving is 8 ounces of milk, 1 cup of yogurt, or 1 ½ ounces of cheese. · Eat 6 to 8 servings of grains each day.  A serving is 1 slice of bread, 1 ounce of dry cereal, or ½ cup of cooked rice, pasta, or cooked cereal. Try to choose whole-grain products as much as possible. · Limit lean meat, poultry, and fish to 2 servings each day. A serving is 3 ounces, about the size of a deck of cards. · Eat 4 to 5 servings of nuts, seeds, and legumes (cooked dried beans, lentils, and split peas) each week. A serving is 1/3 cup of nuts, 2 tablespoons of seeds, or ½ cup of cooked beans or peas. · Limit fats and oils to 2 to 3 servings each day. A serving is 1 teaspoon of vegetable oil or 2 tablespoons of salad dressing. · Limit sweets and added sugars to 5 servings or less a week. A serving is 1 tablespoon jelly or jam, ½ cup sorbet, or 1 cup of lemonade. · Eat less than 2,300 milligrams (mg) of sodium a day. If you limit your sodium to 1,500 mg a day, you can lower your blood pressure even more. Tips for success  · Start small. Do not try to make dramatic changes to your diet all at once. You might feel that you are missing out on your favorite foods and then be more likely to not follow the plan. Make small changes, and stick with them. Once those changes become habit, add a few more changes. · Try some of the following:  ? Make it a goal to eat a fruit or vegetable at every meal and at snacks. This will make it easy to get the recommended amount of fruits and vegetables each day. ? Try yogurt topped with fruit and nuts for a snack or healthy dessert. ? Add lettuce, tomato, cucumber, and onion to sandwiches. ? Combine a ready-made pizza crust with low-fat mozzarella cheese and lots of vegetable toppings. Try using tomatoes, squash, spinach, broccoli, carrots, cauliflower, and onions. ? Have a variety of cut-up vegetables with a low-fat dip as an appetizer instead of chips and dip. ? Sprinkle sunflower seeds or chopped almonds over salads. Or try adding chopped walnuts or almonds to cooked vegetables.   ? Try some vegetarian meals using beans and peas. Add garbanzo or kidney beans to salads. Make burritos and tacos with mashed ramirez beans or black beans. Where can you learn more? Go to http://zac-ирина.info/. Enter S265 in the search box to learn more about \"DASH Diet: Care Instructions. \"  Current as of: July 22, 2018  Content Version: 11.9  © 3588-2907 [employer]. Care instructions adapted under license by [employer] (which disclaims liability or warranty for this information). If you have questions about a medical condition or this instruction, always ask your healthcare professional. [address] any warranty or liability for your use of this information.

## 2019-07-02 NOTE — PROGRESS NOTES
Chief Complaint   Patient presents with    Blood Pressure Check     blood pressure check. patient has readings with her today     1. Have you been to the ER, urgent care clinic since your last visit? Hospitalized since your last visit? No    2. Have you seen or consulted any other health care providers outside of the [address] [place] [address] since your last visit? Include any pap smears or colon screening.  No

## 2019-07-03 LAB
ALBUMIN SERPL-MCNC: 4.5 G/DL (ref 3.5–5.5)
ALBUMIN/CREAT UR: 3.7 MG/G CREAT (ref 0–30)
ALBUMIN/GLOB SERPL: 2.4 {RATIO} (ref 1.2–2.2)
ALP SERPL-CCNC: 111 IU/L (ref 39–117)
ALT SERPL-CCNC: 16 IU/L (ref 0–32)
AST SERPL-CCNC: 22 IU/L (ref 0–40)
BASOPHILS # BLD AUTO: 0 X10E3/UL (ref 0–0.2)
BASOPHILS NFR BLD AUTO: 0 %
BILIRUB SERPL-MCNC: 0.3 MG/DL (ref 0–1.2)
BUN SERPL-MCNC: 18 MG/DL (ref 6–24)
BUN/CREAT SERPL: 22 (ref 9–23)
CALCIUM SERPL-MCNC: 9.3 MG/DL (ref 8.7–10.2)
CHLORIDE SERPL-SCNC: 110 MMOL/L (ref 96–106)
CHOLEST SERPL-MCNC: 192 MG/DL (ref 100–199)
CO2 SERPL-SCNC: 20 MMOL/L (ref 20–29)
CREAT SERPL-MCNC: 0.81 MG/DL (ref 0.57–1)
CREAT UR-MCNC: 252 MG/DL
EOSINOPHIL # BLD AUTO: 0.2 X10E3/UL (ref 0–0.4)
EOSINOPHIL NFR BLD AUTO: 2 %
ERYTHROCYTE [DISTWIDTH] IN BLOOD BY AUTOMATED COUNT: 13.3 % (ref 12.3–15.4)
EST. AVERAGE GLUCOSE BLD GHB EST-MCNC: 123 MG/DL
GLOBULIN SER CALC-MCNC: 1.9 G/DL (ref 1.5–4.5)
GLUCOSE SERPL-MCNC: 115 MG/DL (ref 65–99)
HBA1C MFR BLD: 5.9 % (ref 4.8–5.6)
HCT VFR BLD AUTO: 43.8 % (ref 34–46.6)
HDLC SERPL-MCNC: 70 MG/DL
HGB BLD-MCNC: 14.6 G/DL (ref 11.1–15.9)
IMM GRANULOCYTES # BLD AUTO: 0 X10E3/UL (ref 0–0.1)
IMM GRANULOCYTES NFR BLD AUTO: 0 %
INTERPRETATION, 910389: NORMAL
LDLC SERPL CALC-MCNC: 107 MG/DL (ref 0–99)
LYMPHOCYTES # BLD AUTO: 2.4 X10E3/UL (ref 0.7–3.1)
LYMPHOCYTES NFR BLD AUTO: 25 %
MCH RBC QN AUTO: 31.1 PG (ref 26.6–33)
MCHC RBC AUTO-ENTMCNC: 33.3 G/DL (ref 31.5–35.7)
MCV RBC AUTO: 93 FL (ref 79–97)
MICROALBUMIN UR-MCNC: 9.2 UG/ML
MONOCYTES # BLD AUTO: 1 X10E3/UL (ref 0.1–0.9)
MONOCYTES NFR BLD AUTO: 10 %
NEUTROPHILS # BLD AUTO: 5.8 X10E3/UL (ref 1.4–7)
NEUTROPHILS NFR BLD AUTO: 63 %
PLATELET # BLD AUTO: 273 X10E3/UL (ref 150–450)
POTASSIUM SERPL-SCNC: 4.4 MMOL/L (ref 3.5–5.2)
PROT SERPL-MCNC: 6.4 G/DL (ref 6–8.5)
RBC # BLD AUTO: 4.69 X10E6/UL (ref 3.77–5.28)
SODIUM SERPL-SCNC: 146 MMOL/L (ref 134–144)
TRIGL SERPL-MCNC: 76 MG/DL (ref 0–149)
VLDLC SERPL CALC-MCNC: 15 MG/DL (ref 5–40)
WBC # BLD AUTO: 9.4 X10E3/UL (ref 3.4–10.8)

## 2019-07-22 DIAGNOSIS — F41.8 SITUATIONAL ANXIETY: ICD-10-CM

## 2019-07-22 NOTE — TELEPHONE ENCOUNTER
From  Jennifer Vargas To  [facility] Nurse Pool Sent  7/22/2019  2:07 PM   I am looking at my lab results. I did not hear back from you on my labs but it seems everything is similar to my last labs- still prediabetic. Is there anything else I need to work on ? Also, can I get a refill on my lorazepam prescription? Thanks.      Al Be

## 2019-07-23 RX ORDER — ALPRAZOLAM 1 MG/1
1 TABLET ORAL
Qty: 15 TAB | Refills: 0 | Status: SHIPPED | OUTPATIENT
Start: 2019-07-23 | End: 2019-07-23

## 2019-07-23 NOTE — TELEPHONE ENCOUNTER
Rx for ALPRAZolam Winter Al) 1 mg tablet #15 with 0 refills called in to patients pharmacy on 7/23/2019 @ 7:24 pm.

## 2020-10-13 ENCOUNTER — VIRTUAL VISIT (OUTPATIENT)
Dept: FAMILY MEDICINE CLINIC | Age: 55
End: 2020-10-13
Payer: COMMERCIAL

## 2020-10-13 DIAGNOSIS — J30.1 SEASONAL ALLERGIC RHINITIS DUE TO POLLEN: ICD-10-CM

## 2020-10-13 DIAGNOSIS — J01.00 ACUTE NON-RECURRENT MAXILLARY SINUSITIS: Primary | ICD-10-CM

## 2020-10-13 PROCEDURE — 99214 OFFICE O/P EST MOD 30 MIN: CPT | Performed by: NURSE PRACTITIONER

## 2020-10-13 RX ORDER — AMOXICILLIN AND CLAVULANATE POTASSIUM 875; 125 MG/1; MG/1
1 TABLET, FILM COATED ORAL 2 TIMES DAILY
Qty: 14 TAB | Refills: 0 | Status: SHIPPED | OUTPATIENT
Start: 2020-10-13 | End: 2020-10-20

## 2020-10-13 RX ORDER — DICLOFENAC SODIUM 75 MG/1
75 TABLET, DELAYED RELEASE ORAL 2 TIMES DAILY
COMMUNITY
Start: 2019-08-23 | End: 2020-10-13

## 2020-10-13 RX ORDER — CLOBETASOL PROPIONATE 0.5 MG/G
CREAM TOPICAL
COMMUNITY
Start: 2020-09-26

## 2020-10-13 RX ORDER — CALCIPOTRIENE, BETAMETHASONE DIPROPIONATE 50; .643 UG/G; MG/G
OINTMENT TOPICAL
COMMUNITY
Start: 2020-09-28

## 2020-10-13 RX ORDER — CLOBETASOL PROPIONATE 0.46 MG/ML
SOLUTION TOPICAL
COMMUNITY
Start: 2020-07-14

## 2020-10-13 RX ORDER — ALPRAZOLAM 1 MG/1
TABLET ORAL
COMMUNITY
Start: 2019-07-23 | End: 2020-10-22 | Stop reason: SDUPTHER

## 2020-10-13 NOTE — PROGRESS NOTES
[facility] Note  Subjective:      Tiki Berger is a 63 y.o. female who presents for an acute visit with the following chief complaints. Chief Complaint   Patient presents with    Sinus Pain     I was in the office while conducting this encounter. Consent:  She and/or her healthcare decision maker is aware that this patient-initiated Telehealth encounter is a billable service, with coverage as determined by her insurance carrier. She is aware that she may receive a bill and has provided verbal consent to proceed: Yes    This virtual visit was conducted via [employer]. Pursuant to the emergency declaration under the [facility], [facility] waiver authority and the [employer] and [employer] Act, this Virtual  Visit was conducted to reduce the patient's risk of exposure to COVID-19 and provide continuity of care for an established patient. Services were provided through a video synchronous discussion virtually to substitute for in-person clinic visit. Due to this being a TeleHealth evaluation, many elements of the physical examination are unable to be assessed. Total Time: minutes: 5-10 minutes. She complains of sinus pressure and nasal congestion. Onset was 1.5 weeks ago, worsening since onset. Right sided maxially and frontal sinus pressure. Associated post nasal drainage and dry cough which is worse at night and in AM.   History of allergies and seasonal sinus infections. Allergy sympotms started a few weeks ago. Treatment: flonase daily for several weeks. Started Mucinex and tylenol a few days ago with little relief.       Current Outpatient Medications   Medication Sig Dispense Refill    calcipotriene-betamethasone (TACLONEX) 0.005-0.064 % topical ointment APPLY TO AA ON BODY BID PRN FOR 2 WEEKS ON THEN OFF X 1 WEEK REPEAT AS NEEDED      clobetasoL (TEMOVATE) 0.05 % topical cream APPLY TO PSORIASIS BID UP TO 2 WEEKS/MONTH PRN      clobetasoL (TEMOVATE) 0.05 % external solution APPLY TO PSORIASIS ON SCALP QD AS NEEDED. DO NOT USE ON FACE OR AREAS OF NORMAL SKIN      PROAIR HFA 90 mcg/actuation inhaler INHALE 2 INHALATIONS BY MOUTH EVERY 6 HOURS AS NEEDED FOR WHEEZING OR SHORTNESS OF BREATH 1 Inhaler 0    ketotifen (ZADITOR) 0.025 % (0.035 %) ophthalmic solution Administer 1 Drop to both eyes two (2) times a day.  triamcinolone (NASACORT) 55 mcg nasal inhaler 2 Sprays by Both Nostrils route daily. 1 Bottle 5    ALPRAZolam (XANAX) 1 mg tablet TAKE 1 TABLET BY MOUTH ONCE AS NEEDED FOR SEVERE ANXIETY UP TO 1 DOSE      cholecalciferol, vitamin D3, (VITAMIN D3) 2,000 unit Tab Take  by mouth. Allergies   Allergen Reactions    Other Medication Other (comments)    Codeine Other (comments)     GI upset    Sudafed [Pseudoephedrine Hcl] Other (comments)     Intolerant, crawling sensation, jitteriness     Past Medical History:   Diagnosis Date    AR (allergic rhinitis) 8/30/2010    Asthma 8/30/2010    HTN, goal below 140/90 1/16/2017    Psoriasis 8/30/2010       ROS:   Complete review of systems was reviewed with pertinent information listed in HPI. Review of Systems   Constitutional: Negative for chills, diaphoresis, fever, malaise/fatigue and weight loss. HENT: Positive for congestion and sinus pain. Negative for ear pain, hearing loss, sore throat and tinnitus. Eyes: Negative for blurred vision and double vision. Respiratory: Negative for cough, hemoptysis, sputum production, shortness of breath and wheezing. Cardiovascular: Negative for chest pain, palpitations and leg swelling. Gastrointestinal: Negative for abdominal pain, blood in stool, constipation, diarrhea, heartburn, melena, nausea and vomiting. Genitourinary: Negative for dysuria, frequency, hematuria and urgency. Musculoskeletal: Negative for joint pain and myalgias. Skin: Negative for itching and rash. Neurological: Negative for dizziness, tingling, weakness and headaches. Endo/Heme/Allergies: Negative for polydipsia. Psychiatric/Behavioral: Negative. Objective: There were no vitals taken for this visit. Vitals and Nurse Documentation reviewed. Physical Exam  Constitutional:       General: She is not in acute distress. Appearance: Normal appearance. She is well-developed, well-groomed and normal weight. She is not ill-appearing, toxic-appearing or diaphoretic. HENT:      Head: Normocephalic and atraumatic. Right Ear: Hearing normal.      Left Ear: Hearing normal.      Nose: No nasal deformity. Mouth/Throat:      Lips: Pink. No lesions. Mouth: Mucous membranes are moist.   Eyes:      General: Lids are normal.      Conjunctiva/sclera: Conjunctivae normal.   Pulmonary:      Effort: Pulmonary effort is normal.   Neurological:      Mental Status: She is alert and oriented to person, place, and time. Gait: Gait is intact. Psychiatric:         Attention and Perception: Attention normal.         Mood and Affect: Mood normal.         Speech: Speech normal.         Behavior: Behavior normal. Behavior is cooperative. Thought Content: Thought content normal.         Cognition and Memory: Cognition normal.         Judgment: Judgment normal.         Assessment/Plan:     Diagnoses and all orders for this visit:    1. Acute non-recurrent maxillary sinusitis: Exam limited virtually but symptoms consistent with sinusitis. Start antibiotic therapy given duration of symptoms. Continue symptomatic therapy; Rest, increase fluids, NSAID's PRN fever and/or discomfort. Mucinex, nasal decongestant, nasal saline rinses for nasal congestion. RTC if symptoms worsen or do not resolve. -     amoxicillin-clavulanate (AUGMENTIN) 875-125 mg per tablet; Take 1 Tab by mouth two (2) times a day for 7 days. Indications: acute bacterial infection of the sinuses    2.  Seasonal allergic rhinitis due to pollen: Continue Flonase, add daily antihistamine. Follow-up and Dispositions    · Return if symptoms worsen or fail to improve.        Discussed expected course/resolution/complications of diagnosis in detail with patient.    Medication risks/benefits/costs/interactions/alternatives discussed with patient.    Pt expressed understanding with the diagnosis and plan

## 2020-10-14 ENCOUNTER — PATIENT MESSAGE (OUTPATIENT)
Dept: FAMILY MEDICINE CLINIC | Age: 55
End: 2020-10-14

## 2020-10-14 DIAGNOSIS — F41.8 SITUATIONAL ANXIETY: Primary | ICD-10-CM

## 2020-10-22 RX ORDER — ALPRAZOLAM 1 MG/1
1 TABLET ORAL
Qty: 30 TAB | Refills: 0 | Status: SHIPPED | OUTPATIENT
Start: 2020-10-22 | End: 2021-02-22 | Stop reason: SDUPTHER

## 2020-10-22 NOTE — TELEPHONE ENCOUNTER
From: Cam Alva  To: Thom Miranda NP  Sent: 10/14/2020 3:26 PM EDT  Subject: Non-Urgent Medical Question    I met with NP Mirtha Gaming yesterday. I would have preferred to meet with you but you were not available and was not allowed an in person appointment due to respiratory symptoms. A few things that I forgot to address with her:  1. 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 Does this put me at risk? Should I consider medical leave with a Dr. note? 2. I forgot to address the refill on my Alprazolam. I forgot to mention this to her. My last refill was July 2019. Thank you for your time.    Fozia Tilley

## 2020-11-03 ENCOUNTER — VIRTUAL VISIT (OUTPATIENT)
Dept: FAMILY MEDICINE CLINIC | Age: 55
End: 2020-11-03
Payer: COMMERCIAL

## 2020-11-03 DIAGNOSIS — F41.1 GAD (GENERALIZED ANXIETY DISORDER): ICD-10-CM

## 2020-11-03 DIAGNOSIS — I10 ESSENTIAL HYPERTENSION: Primary | ICD-10-CM

## 2020-11-03 DIAGNOSIS — J45.20 INTERMITTENT ASTHMA WITHOUT COMPLICATION, UNSPECIFIED ASTHMA SEVERITY: ICD-10-CM

## 2020-11-03 PROCEDURE — 99213 OFFICE O/P EST LOW 20 MIN: CPT | Performed by: NURSE PRACTITIONER

## 2020-11-03 RX ORDER — LISINOPRIL 10 MG/1
10 TABLET ORAL DAILY
Qty: 30 TAB | Refills: 3 | Status: SHIPPED | OUTPATIENT
Start: 2020-11-03

## 2020-11-03 NOTE — PROGRESS NOTES
Assessment/Plan:     Diagnoses and all orders for this visit:    1. Essential hypertension  -     lisinopriL (PRINIVIL, ZESTRIL) 10 mg tablet; Take 1 Tab by mouth daily. Uncontrolled. Restart treatment as above. Follow-up in 1 month or sooner as needed. 2. Intermittent asthma without complication, unspecified asthma severity    3. ANTONIO (generalized anxiety disorder)    She will bring forms for virtual teaching accommodations. Discussed expected course/resolution/complications of diagnosis in detail with patient. Medication risks/benefits/costs/interactions/alternatives discussed with patient. Pt was given after visit summary which includes diagnoses, current medications & vitals. Pt expressed understanding with the diagnosis and plan      The patient has consented for synchronous (real-time) Telemedicine (audio-video technology) on 11/3/2020 for their care to be delivered over telemedicine in place of their regularly scheduled office visit pursuant to the emergency declaration under the [address] [place] [address], [address] [place] [address] and the [employer] and [employer] Act, this Virtual  Visit was conducted by the practitioner who is located in the medical office in [place], [place], with patient's consent, to reduce the patient's risk of exposure to COVID-19 and provide continuity of care. Visit Length: 15 minutes    Subjective:      Bj Cotton is a 63 y.o. female who presents for had concerns including Follow-up. Hypertension  Patient is here for follow-up of hypertension. She indicates that she is feeling well and denies any symptoms referable to her hypertension, including chest pain, palpitations, dyspnea, orthopnea, or peripheral edema. Patient has these side effects of medication: has been off treatment for several years. She is not exercising and is not adherent to low salt diet.   Blood pressure is not well controlled at home. Use of agents associated with hypertension: none. History of renal disease: no.  Cardiovascular risk factors: sedentary life style, hypertension, stress, post-menopausal.      She is requiring work accommodations to continue with virtual teaching due to underlying health conditions including worsening anxiety and asthma. She does not find the accommodations from her school to be reasonable and she is interested to continue virtual teaching at this time. Patient Active Problem List   Diagnosis Code    AR (allergic rhinitis) J30.9    Asthma J45.909    Psoriasis L40.9    HTN, goal below 140/90 I10       Current Outpatient Medications   Medication Sig Dispense Refill    lisinopriL (PRINIVIL, ZESTRIL) 10 mg tablet Take 1 Tab by mouth daily. 30 Tab 3    ALPRAZolam (XANAX) 1 mg tablet Take 1 Tab by mouth nightly as needed for Anxiety. Max Daily Amount: 1 mg. 30 Tab 0    calcipotriene-betamethasone (TACLONEX) 0.005-0.064 % topical ointment APPLY TO AA ON BODY BID PRN FOR 2 WEEKS ON THEN OFF X 1 WEEK REPEAT AS NEEDED      clobetasoL (TEMOVATE) 0.05 % topical cream APPLY TO PSORIASIS BID UP TO 2 WEEKS/MONTH PRN      clobetasoL (TEMOVATE) 0.05 % external solution APPLY TO PSORIASIS ON SCALP QD AS NEEDED. DO NOT USE ON FACE OR AREAS OF NORMAL SKIN      PROAIR HFA 90 mcg/actuation inhaler INHALE 2 INHALATIONS BY MOUTH EVERY 6 HOURS AS NEEDED FOR WHEEZING OR SHORTNESS OF BREATH 1 Inhaler 0    ketotifen (ZADITOR) 0.025 % (0.035 %) ophthalmic solution Administer 1 Drop to both eyes two (2) times a day.  triamcinolone (NASACORT) 55 mcg nasal inhaler 2 Sprays by Both Nostrils route daily. 1 Bottle 5    cholecalciferol, vitamin D3, (VITAMIN D3) 2,000 unit Tab Take  by mouth.          Allergies   Allergen Reactions    Other Medication Other (comments)    Codeine Other (comments)     GI upset    Sudafed [Pseudoephedrine Hcl] Other (comments)     Intolerant, crawling sensation, jitteriness       ROS: Review of Systems   Constitutional: Negative for malaise/fatigue. Eyes: Negative for blurred vision. Respiratory: Negative for shortness of breath. Cardiovascular: Negative for chest pain. Psychiatric/Behavioral: The patient is nervous/anxious. Objective: There were no vitals taken for this visit. Vitals and Nurse Documentation reviewed. Physical Exam  Constitutional:       Appearance: Normal appearance. Neurological:      Mental Status: She is alert.    Psychiatric:         Attention and Perception: Attention normal.         Mood and Affect: Mood normal.         Speech: Speech normal.         Behavior: Behavior normal.         Cognition and Memory: Cognition normal.

## 2021-01-18 ENCOUNTER — IMMUNIZATION (OUTPATIENT)
Dept: INTERNAL MEDICINE CLINIC | Age: 56
End: 2021-01-18
Payer: COMMERCIAL

## 2021-01-18 DIAGNOSIS — Z23 ENCOUNTER FOR IMMUNIZATION: Primary | ICD-10-CM

## 2021-01-18 PROCEDURE — 0011A COVID-19, MRNA, LNP-S, PF, 100MCG/0.5ML DOSE(MODERNA): CPT | Performed by: FAMILY MEDICINE

## 2021-01-18 PROCEDURE — 91301 COVID-19, MRNA, LNP-S, PF, 100MCG/0.5ML DOSE(MODERNA): CPT | Performed by: FAMILY MEDICINE

## 2021-02-15 ENCOUNTER — IMMUNIZATION (OUTPATIENT)
Dept: INTERNAL MEDICINE CLINIC | Age: 56
End: 2021-02-15
Payer: COMMERCIAL

## 2021-02-15 DIAGNOSIS — Z23 ENCOUNTER FOR IMMUNIZATION: Primary | ICD-10-CM

## 2021-02-15 PROCEDURE — 91301 COVID-19, MRNA, LNP-S, PF, 100MCG/0.5ML DOSE(MODERNA): CPT | Performed by: FAMILY MEDICINE

## 2021-02-15 PROCEDURE — 0012A COVID-19, MRNA, LNP-S, PF, 100MCG/0.5ML DOSE(MODERNA): CPT | Performed by: FAMILY MEDICINE

## 2021-02-22 DIAGNOSIS — F41.8 SITUATIONAL ANXIETY: ICD-10-CM

## 2021-02-22 RX ORDER — ALPRAZOLAM 1 MG/1
1 TABLET ORAL
Qty: 30 TAB | Refills: 0 | Status: SHIPPED | OUTPATIENT
Start: 2021-02-22

## 2021-02-22 NOTE — TELEPHONE ENCOUNTER
MD Alexei Parisi,    Patient request for refill of alprazolam.  Check . Thanks, Zully Moore    Last Visit: VV 11/3/20 NP Arian  Next Appointment: Not scheduled- was to return 12/2020  Previous Refill Encounter(s): 10/22/20 30    Requested Prescriptions     Pending Prescriptions Disp Refills    ALPRAZolam (XANAX) 1 mg tablet 30 Tab 0     Sig: Take 1 Tab by mouth nightly as needed for Anxiety. Max Daily Amount: 1 mg.

## 2022-03-19 PROBLEM — I10 HTN, GOAL BELOW 140/90: Status: ACTIVE | Noted: 2017-01-16

## 2023-08-23 ENCOUNTER — TELEPHONE (OUTPATIENT)
Age: 58
End: 2023-08-23

## 2023-08-23 NOTE — TELEPHONE ENCOUNTER
----- Message from Chepe Padilla sent at 8/23/2023  1:48 PM EDT -----  Subject: Message to Provider    QUESTIONS  Information for Provider? Patient had completed a medical release form   from her new pcp office and they had requested her records from this   office in June but have yet to receive these records. [place] [address] [address] . Please call the office to advise.   ---------------------------------------------------------------------------  --------------  Ana WRIGHT  [phone]; OK to leave message on voicemail  ---------------------------------------------------------------------------  --------------  SCRIPT ANSWERS  Relationship to Patient?  Self

## 2023-08-24 NOTE — TELEPHONE ENCOUNTER
Spoke to pt on 8.24.23 informed her that her request for her Medical Record's went to [facility]. \"Pt stated it has been (2) month's\" I gave the pt the [facility] number asvised her to call them to check status.